# Patient Record
Sex: FEMALE | Race: WHITE
[De-identification: names, ages, dates, MRNs, and addresses within clinical notes are randomized per-mention and may not be internally consistent; named-entity substitution may affect disease eponyms.]

---

## 2017-03-13 NOTE — MM
Reason for exam: additional evaluation requested from abnormal screening.



History:

Patient is postmenopausal.



Physical Findings:

Breast exam preformed at baseline screening.



MG Work Up Mamm w CAD RT

MLO view(s) were taken of the right breast.

There are scattered fibroglandular densities.

Finding: There is a 3 mm equal density (isodense), lobulated mass in the right 

breast consistent with benign disease. There is no discrete abnormality, including

area of concern.



These results were verbally communicated with the patient and result sheet given 

to the patient on 3/10/17.





ASSESSMENT: Probably benign, BI-RAD 3



RECOMMENDATION:

Follow-up diagnostic mammogram of the right breast in 6 months.

## 2017-03-13 NOTE — MM
Reason for exam: screening  (asymptomatic).

Baseline mammogram.



History:

Patient is postmenopausal.



Physical Findings:

Nurse did not find any significant physical abnormalities on exam.



MG Screening Mammo w CAD

Bilateral CC and MLO view(s) were taken.

There are scattered fibroglandular densities.  Asymmetric breast tissue in the 

right breast.



These results were verbally communicated with the patient and result sheet given 

to the patient on 3/10/17.





ASSESSMENT: Incomplete: need additional imaging evaluation, BI-RAD 0



RECOMMENDATION:

Special view mammogram of the right breast.

## 2017-09-11 NOTE — MM
Reason for exam: follow-up at short interval from prior study.

Last mammogram was performed 6 months ago.



History:

Patient is postmenopausal.



Physical Findings:

Nurse did not find any significant physical abnormalities on exam.



MG Diagnostic Mammo RT w CAD

CC and MLO view(s) were taken of the right breast.

Prior study comparison: March 10, 2017, right breast MG work up mamm w CAD RT.  

March 10, 2017, bilateral MG screening mammo w CAD.

The breast tissue is heterogeneously dense. This may lower the sensitivity of 

mammography.

Finding: There is a 8 mm mass with focal asymmetry located 10-11 cm from the 

nipple in the upper outer quadrant, posterior position of the right breast.



These results were verbally communicated with the patient and result sheet given 

to the patient on 09/11/17.





ASSESSMENT: Incomplete: need additional imaging evaluation, BI-RAD 0



RECOMMENDATION:

Ultrasound of the right breast.

(upper outer quadrant posterior depth).

## 2017-09-11 NOTE — USB
Reason for exam: follow-up at short interval from prior study.



History:

Patient is postmenopausal.



US Breast Limited RT

Right breast ultrasound demonstrates a 0.9 x 1.2 x 0.4cm oval lesion in the axilla

node, no suspicious abnormality or sonographic correlate.



These results were verbally communicated with the patient and result sheet given 

to the patient on 09/11/17.





ASSESSMENT: Probably benign, BI-RAD 3



RECOMMENDATION:

Follow-up diagnostic mammogram of the right breast in 6 months.

## 2019-07-11 ENCOUNTER — HOSPITAL ENCOUNTER (OUTPATIENT)
Dept: HOSPITAL 47 - ORWHC2ENDO | Age: 44
Discharge: HOME | End: 2019-07-11
Payer: COMMERCIAL

## 2019-07-11 VITALS — BODY MASS INDEX: 27.8 KG/M2

## 2019-07-11 VITALS — DIASTOLIC BLOOD PRESSURE: 71 MMHG | HEART RATE: 73 BPM | RESPIRATION RATE: 18 BRPM | SYSTOLIC BLOOD PRESSURE: 114 MMHG

## 2019-07-11 VITALS — TEMPERATURE: 98 F

## 2019-07-11 DIAGNOSIS — Z79.899: ICD-10-CM

## 2019-07-11 DIAGNOSIS — K29.50: ICD-10-CM

## 2019-07-11 DIAGNOSIS — Z97.2: ICD-10-CM

## 2019-07-11 DIAGNOSIS — Z90.710: ICD-10-CM

## 2019-07-11 DIAGNOSIS — Z90.49: ICD-10-CM

## 2019-07-11 DIAGNOSIS — K21.0: Primary | ICD-10-CM

## 2019-07-11 DIAGNOSIS — F17.210: ICD-10-CM

## 2019-07-11 DIAGNOSIS — K44.9: ICD-10-CM

## 2019-07-11 PROCEDURE — 88305 TISSUE EXAM BY PATHOLOGIST: CPT

## 2019-07-11 PROCEDURE — 43239 EGD BIOPSY SINGLE/MULTIPLE: CPT

## 2019-07-11 NOTE — P.PCN
Date of Procedure: 07/11/19


Description of Procedure: 





BRIEF HISTORY: 


43-year-old female who presented for outpatient EGD for evaluation of epigastric

pain.  The patient reports a known history of gastroesophageal reflux disease 

for which she is on omeprazole.  She reports taking the omeprazole nightly.  She

states that the epigastric pain has been constant for the past few months and is

here for further evaluation.  She denies any NSAID use and takes Tylenol for 

pain.





PROCEDURE PERFORMED: 


Esophagogastroduodenoscopy with biopsy.





PREOPERATIVE DIAGNOSIS: 


Epigastric abdominal pain, GERD. 





ESTIMATED BLOOD LOSS: 


Minimal.





IV sedation per anesthesia. 





PROCEDURE: 


After informed consent was obtained, the patient  was brought into the endoscopy

unit. IV sedation was administered by Anesthesia under continuous monitoring. 

Initially the Olympus GIF-190 video endoscope was inserted into the mouth. 

Esophagus intubated without any difficulty. It was gradually advanced into the 

stomach and duodenum and carefully examined. The bulb and the second part of the

duodenum appeared normal and was biopsied. The scope at this time was withdrawn 

to the stomach, adequately insufflated with air, and upon careful examination, 

mucosa of the antrum, body, cardia and the fundus appeared grossly normal except

for some mild scattered erythema in the antrum and body suggestive of gastritis 

with biopsies. The scope was then withdrawn into the esophagus.  A small hiatal 

hernia was noted.  The GE junction was located at 37 cm from the incisors and 

biopsied. The esophagus appeared normal. There were no erosions or ulcerations 

seen and the patient tolerated the procedure well. 





IMPRESSION: 


1.  Mild gastritis antrum and body, biopsied.


2.  Small hiatal hernia.


3.  GE junction biopsy, duodenal biopsies





RECOMMENDATIONS: 


The findings of this examination were discussed with the patient and her 

boyfriend and son.  Patient instructed to change omeprazole to before meals and 

can try some OTC Zantac before bed.  Await pathology from biopsies.  Otherwise 

okay to resume medications and feeding.

## 2024-08-13 ENCOUNTER — HOSPITAL ENCOUNTER (INPATIENT)
Dept: HOSPITAL 47 - 4SSUR | Age: 49
LOS: 6 days | Discharge: HOME | DRG: 854 | End: 2024-08-19
Attending: INTERNAL MEDICINE | Admitting: INTERNAL MEDICINE
Payer: COMMERCIAL

## 2024-08-13 DIAGNOSIS — A41.9: Primary | ICD-10-CM

## 2024-08-13 DIAGNOSIS — N13.6: ICD-10-CM

## 2024-08-13 DIAGNOSIS — Z90.710: ICD-10-CM

## 2024-08-13 DIAGNOSIS — Z90.49: ICD-10-CM

## 2024-08-13 DIAGNOSIS — N20.1: ICD-10-CM

## 2024-08-13 DIAGNOSIS — Z91.048: ICD-10-CM

## 2024-08-13 PROCEDURE — 94760 N-INVAS EAR/PLS OXIMETRY 1: CPT

## 2024-08-14 PROCEDURE — 0T778DZ DILATION OF LEFT URETER WITH INTRALUMINAL DEVICE, VIA NATURAL OR ARTIFICIAL OPENING ENDOSCOPIC: ICD-10-PCS

## 2024-09-06 NOTE — CT
Patient

Cha Flores

ID

[id number]

[id number]

Order #

Accession #

[id number]

 

EXAMINATION TYPE: CT abdomen pelvis wo con

 

DATE OF EXAM: 8/13/2024

 

COMPARISON: No comparison CT downtime PACS

 

INDICATION: Left flank pain

 

DLP: 582.8 mGycm, Automated exposure control for dose reduction was used.

 

CONTRAST:  0 mL of Isovue 300. 

                        Study performed without Oral Contrast

 

TECHNIQUE: Axial images were obtained from above the diaphragm to the pubic rami in the axial plane a
t 5 mm thick sections.  Reconstructed images are reviewed on the computer in the coronal plane.  

 

FINDINGS:

 

Limited CT sections are obtained the lung bases.  Dependent compressive atelectasis within the poster
ior lung bases is present..

 

CT ABDOMEN:

 

Liver: Normal

 

Spleen: Normal

 

Pancreas: Normal

 

Adrenal glands: The adrenal glands are normal.

 

Gallbladder: Surgically absent  

 

Kidneys: No masses are evident. No hydronephrosis is present.   No cysts are present.  There is a 0.2
 cm calcification anterior mid to inferior pole right kidney. This is nonobstructing. There is a nono
bstructing 0.2 cm calcification lateral right mid kidney. No left renal stones are evident. However, 
there is a large 1.1 x 0.9 cm obstructing left ureteropelvic stone with mild left hydronephrosis. No 
hydroureter is evident.

 

Aorta: Vascular calcification is within the aorta. 

 

Inferior vena cava: Normal.

 

CT PELVIS: There is some stranding in the left paracolic gutter

Loops of bowel within the abdomen and pelvis are normal.     This study is without oral contrast limi
ting bowel evaluation.

 

Appendix: Not identified. No dilated tubular structure or inflammatory change is evident.

 

Urinary bladder: Normal. 

 

Genitourinary structures: Uterus and ovaries are not identified.

 

Osseous structures: No suspicious lytic or sclerotic lesions.

 

IMPRESSION:

1.  1.1 x 0.9 cm obstructing left ureteral pelvic junction stone with mild left hydronephrosis.

2. There is some stranding and fluid within the left paracolic gutter.

3. Nonobstructing right stones.

## 2024-09-12 NOTE — CONS
CONSULTATION



She is in room 20 in the emergency room.



DIAGNOSES:

Left ureteral stone, urinary tract infection with sepsis, left pyonephrosis.



HISTORY OF PRESENT ILLNESS:

The patient is a 48-year-old female, who came to the emergency room after 48-hour

history of left flank pain.  There is no history of stones.  When she was evaluated, a

CT scan was obtained.  The CT scan identified about an 8 mm proximal ureteral stone

with obstruction.  She was found to have a temperature of 99.9.  White count was

elevated at 23,000.  Urine was infected.  She is admitted for IV antibiotics and

urologic consultation.  The patient has no history of stones.  She states that she

feels washed out.  She does have a history of bladder infections.



PAST MEDICAL HISTORY:



ALLERGIES:

None.



MEDICATIONS:

None.



MEDICAL ILLNESSES:

None.



PAST SURGICAL HISTORY:

Includes hand surgery, hysterectomy, and cholecystectomy.



REVIEW OF SYSTEMS:

Noncontributory.



PHYSICAL EXAMINATION:

GENERAL:  Pleasant white female.

VITAL SIGNS:  Heart rate is 99.9.  She is slightly tachycardic.

HEENT:  Clear.

CHEST:  Clear to auscultation.

HEART:  Without murmur or gallop.  ABDOMEN:  Soft.  There is some left flank pain.

EXTREMITIES:  Without edema.

NEUROLOGICAL: Grossly intact.



LABORATORY DATA:

The labs were reviewed and as mentioned above.  The CT scan is reviewed and as

mentioned above.



IMPRESSION:

Left ureteral calculus with obstruction, left hydronephrosis, urinary tract infection

with sepsis.  I discussed with the patient she will need an urgent cysto with stent

placement today and that we will deal with her stone at a later date secondarily.





MMODL / IJN: [id number] / Job#: [id number]

## 2024-09-12 NOTE — PN
PROGRESS NOTE



The patient is in the ICU.  Mrs. Flores underwent emergent placement of a stent

yesterday.  She presented to the emergency room yesterday with an obstructing left

ureteral stone, urinary tract infection with sepsis.  She was hypotensive and

tachycardic.  The stent was placed, and she feels much better and looks much better

today.  Her blood pressure is improved.  She did not require any vasopressors last

night.  Her urine output is good.  She does not have any pain.  Her vital signs are

stable.



This patient underwent a successful stent placement to drain the infected urine behind

an obstructing stone on the left side.  She will continue to recuperate.  She will have

a stone and stent manipulation, but not for a couple of weeks.  This has been explained

at length with this patient.





MMODL / IJN: [id number] / Job#: [id number]

## 2024-09-13 NOTE — PN
PROGRESS NOTE



Mrs. Flores came in the hospital with flank pain.  She was found to have a urinary tract

infection with sepsis due to an obstructing stone with secondary pyonephrosis.  She

required an emergent placement of a double-J catheter on the left side.  She has been

in ICU due to tachycardia and hypotension, which seems to be resolving.  This morning,

she is awake, alert, and comfortable.  Vital signs are stable.  She is afebrile.

Apparently, she has been downgraded off the ICU list and to be transferred to the

floor.



Physical examination is unremarkable as mentioned above.



IMPRESSION:

Successful stent placement for obstructing stone, urinary tract infection with sepsis

and secondary pyonephrosis.



RECOMMENDATIONS:

The patient from a urologic standpoint is doing fine.  She will need a stone and stent

manipulation in 2 to 3 weeks.  I will begin arranging for that.





MMODL / IJN: [id number] / Job#: [id number]

## 2024-09-17 NOTE — XR
Patient

Cha Flores

ID

[id number]

[id number]

Order #

Accession #

[id number]

 

EXAMINATION TYPE: XR chest 1V

 

DATE OF EXAM: 8/17/2024

 

COMPARISON: No comparison available on downtime PACS.

 

INDICATION: CHF

 

TECHNIQUE: Single frontal view of the chest is obtained.

 

FINDINGS:  

The heart size is relatively prominent.  

The pulmonary vasculature is normal.  

Minimal left lower lobe infiltrate is present. Correlate for atelectasis. There is blunting left cost
ophrenic angle. A small left pleural effusion may be present.

 

 

IMPRESSION:  

1. There may be some minimal left basilar infiltrate and minimal left pleural effusion.

## 2024-09-17 NOTE — XR
Patient

Cha Flores

ID

[id number]

[id number]

Order #

Accession #

[id number]

 

EXAMINATION TYPE: XR chest 1V

 

DATE OF EXAM: 8/16/2024

 

COMPARISON: No comparison available on downtime PACS.

 

INDICATION: CHF

 

TECHNIQUE: Single frontal view of the chest is obtained.

 

FINDINGS:  

The heart size is borderline prominent.  

The pulmonary vasculature is mildly prominent.  

The lungs are clear.  

 

 

IMPRESSION:  

1. Correlate for volume overload or early congestive heart failure.

## 2024-09-17 NOTE — P.GSHP
History of Present Illness


H&P Date: 09/17/24





47 yo female recently in the hospital with an infected 1 cm left upj stone. She 

was given ab and underwent a stetn insertion,  She now comes for a left 

ureterocopy with laser lithotripsy.  the risks , complications and alternatives 

have been discussed.





- Constitutional


Constitutional: Denies chills, Denies fever





- EENT


Eyes: denies blurred vision, denies pain


Ears, nose, mouth and throat: Denies headache, Denies sore throat





- Cardiovascular


Cardiovascular: Denies chest pain, Denies shortness of breath





- Respiratory


Respiratory: Denies cough, Denies 7





- Gastrointestinal


Gastrointestinal: Denies abdominal pain, Denies diarrhea, Denies nausea, Denies 

vomiting





- Genitourinary (Female)


Genitourinary: Denies dysuria, Denies hematuria





- Genitourinary (Male)


Genitourinary: Denies dysuria, Denies hematuria





- Musculoskeletal


Musculoskeletal: Denies myalgias





- Integumentary


Integumentary: Denies pruritus, Denies rash





- Neurological


Neurological: Denies numbness, Denies weakness





- Psychiatric


Psychiatric: Denies anxiety, Denies depression





- Endocrine


Endocrine: Denies fatigue, Denies weight change





Past Medical History


Past Medical History: GERD/Reflux


Additional Past Medical History / Comment(s): KIDNEY STONES.  SEASONAL ALLERGIES


History of Any Multi-Drug Resistant Organisms: None Reported


Past Surgical History: Cholecystectomy, Hysterectomy, Orthopedic Surgery


Additional Past Surgical History / Comment(s): HYSTERECTOMY, RIGHT HAND SURGERY 

X2, COLONOSCOPY,EGD


Past Anesthesia/Blood Transfusion Reactions: No Reported Reaction


Smoking Status: Current every day smoker





- Past Family History


  ** Father


Family Medical History: Cancer





Medications and Allergies


                                Home Medications











 Medication  Instructions  Recorded  Confirmed  Type


 


Acetaminophen [Tylenol Extra 1,000 mg PO Q8HR PRN 09/12/24 09/12/24 History





Strength]    


 


Desvenlafaxine Succinate [Pristiq] 50 mg PO HS 09/12/24 09/12/24 History


 


Loratadine [Claritin] 10 mg PO DAILY 09/12/24 09/12/24 History


 


Omeprazole [PriLOSEC] 20 mg PO AC-BRKFST 09/12/24 09/12/24 History


 


lamoTRIgine [LaMICtal] 100 mg PO BID 09/12/24 09/12/24 History








                                    Allergies











Allergy/AdvReac Type Severity Reaction Status Date / Time


 


adhesive AdvReac  ROBERTS Verified 09/12/24 10:51














Surgical - Exam





- General


well developed, well nourished, no distress





- Eyes


normal ocular movement, no icteric





- ENT


no hearing loss, no congestion





- Neck


no masses, trachea midline





- Respiratory


normal respiratory effort, clear to auscultation





- Abdomen


Abdomen: soft, non tender, no guarding, no rigid, no rebound





- Integumentary


no rash, no abnormal pigmentation





- Neurologic


no disoriented, no combative





- Psychiatric


oriented to time, oriented to person, oriented to place, speech is normal, 

memory intact





Assessment and Plan


Assessment: 





Impression:  left ureteral stone s/p ureteral stent placement


Plan:  left ureteroscopy with laser lithotripsy

## 2024-09-17 NOTE — XR
Site ID

[facility]

Cha Palmer

ID

[id number]

DOB[id number]

Order #

Accession #

[id number]

Procedure

CHEST SINGLE VIEW

 

EXAMINATION TYPE: XR chest 1V

 

DATE OF EXAM: 8/15/2024 2:35 PM

 

CLINICAL INDICATION: SOB

 

COMPARISON: THIS EXAM WAS READ DURING PACS DOWNTIME, NO PRIORS AVAILABLE.

 

TECHNIQUE: XR chest 1V Frontal view of the chest.

 

FINDINGS: 

Lungs/Pleura: There is no evidence of pleural effusion, focal consolidation, or pneumothorax.  

Pulmonary vascularity: Pulmonary vascular congestion.

Heart/mediastinum: Cardiomediastinal silhouette is enlarged.

Musculoskeletal: No acute osseous pathology.

 

Other findings: None

 

Lines/Tubes:

 

 

 

 

IMPRESSION: 

Low lung volumes with a generalized hazy appearance which could represent atelectasis versus pulmonar
y edema correlate with serum BNP.

## 2024-09-18 ENCOUNTER — HOSPITAL ENCOUNTER (OUTPATIENT)
Dept: HOSPITAL 47 - OR | Age: 49
Discharge: HOME | End: 2024-09-18
Attending: UROLOGY
Payer: COMMERCIAL

## 2024-09-18 VITALS — HEART RATE: 62 BPM | SYSTOLIC BLOOD PRESSURE: 117 MMHG | DIASTOLIC BLOOD PRESSURE: 73 MMHG | RESPIRATION RATE: 14 BRPM

## 2024-09-18 VITALS — BODY MASS INDEX: 28.8 KG/M2

## 2024-09-18 VITALS — TEMPERATURE: 97.5 F

## 2024-09-18 DIAGNOSIS — N20.0: Primary | ICD-10-CM

## 2024-09-18 DIAGNOSIS — Z90.49: ICD-10-CM

## 2024-09-18 DIAGNOSIS — Z79.899: ICD-10-CM

## 2024-09-18 DIAGNOSIS — K21.9: ICD-10-CM

## 2024-09-18 DIAGNOSIS — Z90.710: ICD-10-CM

## 2024-09-18 DIAGNOSIS — F17.200: ICD-10-CM

## 2024-09-18 PROCEDURE — 52353 CYSTOURETERO W/LITHOTRIPSY: CPT

## 2024-09-18 PROCEDURE — 74018 RADEX ABDOMEN 1 VIEW: CPT

## 2024-09-18 RX ADMIN — KETOROLAC TROMETHAMINE STA MG: 15 INJECTION, SOLUTION INTRAMUSCULAR; INTRAVENOUS at 12:46

## 2024-09-18 RX ADMIN — GENTAMICIN SULFATE PRN MLS: 40 INJECTION, SOLUTION INTRAMUSCULAR; INTRAVENOUS at 10:28

## 2024-09-18 RX ADMIN — HYDROMORPHONE HYDROCHLORIDE PRN MG: 1 INJECTION, SOLUTION INTRAMUSCULAR; INTRAVENOUS; SUBCUTANEOUS at 13:45

## 2024-09-18 RX ADMIN — LIDOCAINE HYDROCHLORIDE STA ML: 10 INJECTION, SOLUTION INFILTRATION; PERINEURAL at 09:34

## 2024-09-18 RX ADMIN — DEXAMETHASONE SODIUM PHOSPHATE ONE MG: 4 INJECTION, SOLUTION INTRAMUSCULAR; INTRAVENOUS at 09:36

## 2024-09-18 RX ADMIN — AMPICILLIN SODIUM PRN MLS: 1 INJECTION, POWDER, FOR SOLUTION INTRAMUSCULAR; INTRAVENOUS at 10:28

## 2024-09-18 RX ADMIN — POTASSIUM CHLORIDE SCH MLS/HR: 14.9 INJECTION, SOLUTION INTRAVENOUS at 09:34

## 2024-09-18 RX ADMIN — ONDANSETRON ONE MG: 2 INJECTION INTRAMUSCULAR; INTRAVENOUS at 09:36

## 2024-09-18 RX ADMIN — POTASSIUM CHLORIDE ONE MLS: 14.9 INJECTION, SOLUTION INTRAVENOUS at 09:34

## 2024-09-18 NOTE — P.OP
Date of Procedure: 09/18/24


Preoperative Diagnosis: 


left renal calculus


Postoperative Diagnosis: 


same


Procedure(s) Performed: 


cystoscopy with left ureteroscopy and laser lithotripsy, removal double-J 

catheter left


Anesthesia: GETA


Surgeon: Benedicto Morales


Estimated Blood Loss (ml): 0


Pathology: none sent


Condition: stable


Disposition: PACU


Indications for Procedure: 


patient is 48.  Approximate one month ago she had a stent placed for an 

obstructing infected stone.  Infection is cleared she now comes for stent stone 

removal


Description of Procedure: 


patient brought to the operating suite.  Given a general anesthetic.  Placed 

lithotomy position with a sterile prep and drape.  Cystoscopy Foroblique lens 

and 21-Icelandic sheath identifies a normal bladder mucosa.  The right ureteral 

orifice is normal the left as a stent.  The stent is grasped and pulled to the 

urethral meatus.  An 035 wires passed through the stent up into the kidney.  The

stent is removed.  Over the wires passed 75-47-Xnrmdw reentry sheath.  The inner

sheath is removed.  Through the 13-Icelandic sheath and passed the flexible 

ureteroscope up into the kidney and identify the 1 cm stone.  With the 200  

laser probe the stone is tight broken up into very tiny pieces so they could be 

easily passed.  Then of the procedure there is no significant fragments that 

need basketing.  I do a pullout ureteroscopy as normal this sheath is removed 

the patient is bladder is drained the patient is awakened and returned recovery 

room good condition.  Patient tolerated procedure well.  Blood loss is 

negligible.  Patient will be discharged home upon recovery

## 2024-09-18 NOTE — XR
EXAMINATION TYPE: XR KUB

 

DATE OF EXAM: 9/18/2024 9:03 AM

 

CLINICAL INDICATION: Female, 48 years old with history of Left Ureteral Stone N20.1; [facility]

 

COMPARISON: None.

 

TECHNIQUE: One radiographic view of the abdomen was obtained.

 

FINDINGS: The bowel gas pattern is nonspecific without dilated loops of small or large bowel. . Fecal
 material and gas are demonstrated throughout the colon and rectum. 

There is no evidence for organomegaly or pneumoperitoneum.  The osseous structures are intact.  Left 
ureteral stent with superior pigtail incompletely coiled and inferior pigtail properly made. Into the
 tube appear in appropriate position. Left renal calculus measuring up to 16 mm.

 

IMPRESSION:

Left ureteral stent with proximal pigtail with incomplete pigtail noted. Left renal stone measuring u
p to 16 mm.

Nonspecific bowel gas pattern without radiographic evidence for acute process.

 

[facility], Workstation: [id number], 9/18/2024 9:51 AM

## 2024-09-24 ENCOUNTER — HOSPITAL ENCOUNTER (OUTPATIENT)
Dept: HOSPITAL 47 - RADXRMAIN | Age: 49
Discharge: HOME | End: 2024-09-24
Attending: UROLOGY
Payer: COMMERCIAL

## 2024-09-24 DIAGNOSIS — N20.2: Primary | ICD-10-CM

## 2024-09-24 PROCEDURE — 74018 RADEX ABDOMEN 1 VIEW: CPT

## 2024-09-24 NOTE — XR
EXAMINATION TYPE: XR KUB

 

DATE OF EXAM: 9/24/2024

 

COMPARISON: NONE

 

HISTORY: Postlithotripsy

 

TECHNIQUE: One view abdominal series

 

FINDINGS:  

The osseous structures are intact.  The bowel gas pattern is nonspecific. Extensive retained fecal de
bris correlation for severe constipation. Calcifications in the pelvis appear stable and are likely v
ascular. Ureteral stent has been removed

 

Right kidney: Obscured by overlying bowel content.

 

Left kidney: There is a large calcification now appears to be fragmented with 7 calcifications estima
kianna measuring 3 mm or less.

 

IMPRESSION:

1. Interval fragmentation of a large lower pole left renal calculus proximally 7 calcification measur
ing 3 mm or less.

 

[facility], Workstation: [id number], 9/24/2024 11:24 AM

## 2024-10-03 NOTE — FL
EXAMINATION TYPE: FL guidance operating room

 

DATE OF EXAM: 9/18/2024 11:47 AM

 

COMPARISON: Pre Operative Images if available both CT/MRI or plain film 

 

CLINICAL INDICATION: Female, 48 years old with history of LEFT URETERAL STONE;

 

TECHNIQUE: FL guidance operating room, multiple fluoroscopic images provided for procedure.

 

Total fluoroscopy time: 44.8 seconds 

Total submitted images to PACS: 1 

DAP: 0.53003 mGym2 Gycm2 uGym2 cGycm2 or equivalent.

 

FINDINGS:

Fluoroscopic images for procedure. Possible calculus projecting over the left renal fossa. Catheter t
ubing also projects over the left abdomen.

 

IMPRESSION:

1.  No evidence for intraoperative complication.

2.  Please see the operative/procedural note for further details.

 

[facility], Workstation: [id number], 10/3/2024 10:33 AM

## 2024-11-05 ENCOUNTER — HOSPITAL ENCOUNTER (OUTPATIENT)
Dept: HOSPITAL 47 - RADUSWWP | Age: 49
Discharge: HOME | End: 2024-11-05
Attending: FAMILY MEDICINE
Payer: COMMERCIAL

## 2024-11-05 DIAGNOSIS — N32.89: ICD-10-CM

## 2024-11-05 DIAGNOSIS — R10.30: Primary | ICD-10-CM

## 2024-11-05 PROCEDURE — 76770 US EXAM ABDO BACK WALL COMP: CPT

## 2024-11-25 ENCOUNTER — HOSPITAL ENCOUNTER (OUTPATIENT)
Dept: HOSPITAL 47 - RADCTMAIN | Age: 49
Discharge: HOME | End: 2024-11-25
Attending: FAMILY MEDICINE
Payer: COMMERCIAL

## 2024-11-25 DIAGNOSIS — N12: ICD-10-CM

## 2024-11-25 DIAGNOSIS — Z87.442: ICD-10-CM

## 2024-11-25 DIAGNOSIS — N20.0: Primary | ICD-10-CM

## 2024-11-25 PROCEDURE — 74178 CT ABD&PLV WO CNTR FLWD CNTR: CPT
